# Patient Record
Sex: FEMALE | Race: BLACK OR AFRICAN AMERICAN | NOT HISPANIC OR LATINO | Employment: FULL TIME | URBAN - METROPOLITAN AREA
[De-identification: names, ages, dates, MRNs, and addresses within clinical notes are randomized per-mention and may not be internally consistent; named-entity substitution may affect disease eponyms.]

---

## 2023-08-02 ENCOUNTER — OFFICE VISIT (OUTPATIENT)
Dept: PRIMARY CARE | Facility: CLINIC | Age: 28
End: 2023-08-02
Payer: MEDICAID

## 2023-08-02 ENCOUNTER — LAB (OUTPATIENT)
Dept: LAB | Facility: LAB | Age: 28
End: 2023-08-02
Payer: MEDICAID

## 2023-08-02 VITALS
TEMPERATURE: 97.8 F | HEART RATE: 121 BPM | SYSTOLIC BLOOD PRESSURE: 113 MMHG | HEIGHT: 65 IN | DIASTOLIC BLOOD PRESSURE: 83 MMHG | WEIGHT: 232.1 LBS | OXYGEN SATURATION: 100 % | BODY MASS INDEX: 38.67 KG/M2

## 2023-08-02 DIAGNOSIS — E03.9 ACQUIRED HYPOTHYROIDISM: ICD-10-CM

## 2023-08-02 DIAGNOSIS — D50.9 IRON DEFICIENCY ANEMIA, UNSPECIFIED IRON DEFICIENCY ANEMIA TYPE: Primary | ICD-10-CM

## 2023-08-02 LAB
ERYTHROCYTE DISTRIBUTION WIDTH (RATIO) BY AUTOMATED COUNT: 16.8 % (ref 11.5–14.5)
ERYTHROCYTE MEAN CORPUSCULAR HEMOGLOBIN CONCENTRATION (G/DL) BY AUTOMATED: 30.8 G/DL (ref 32–36)
ERYTHROCYTE MEAN CORPUSCULAR VOLUME (FL) BY AUTOMATED COUNT: 95 FL (ref 80–100)
ERYTHROCYTES (10*6/UL) IN BLOOD BY AUTOMATED COUNT: 3.9 X10E12/L (ref 4–5.2)
HEMATOCRIT (%) IN BLOOD BY AUTOMATED COUNT: 37 % (ref 36–46)
HEMOGLOBIN (G/DL) IN BLOOD: 11.4 G/DL (ref 12–16)
LEUKOCYTES (10*3/UL) IN BLOOD BY AUTOMATED COUNT: 8.7 X10E9/L (ref 4.4–11.3)
NRBC (PER 100 WBCS) BY AUTOMATED COUNT: 0 /100 WBC (ref 0–0)
PLATELETS (10*3/UL) IN BLOOD AUTOMATED COUNT: 260 X10E9/L (ref 150–450)

## 2023-08-02 PROCEDURE — 3008F BODY MASS INDEX DOCD: CPT | Performed by: STUDENT IN AN ORGANIZED HEALTH CARE EDUCATION/TRAINING PROGRAM

## 2023-08-02 PROCEDURE — 85027 COMPLETE CBC AUTOMATED: CPT

## 2023-08-02 PROCEDURE — 36415 COLL VENOUS BLD VENIPUNCTURE: CPT

## 2023-08-02 PROCEDURE — 1036F TOBACCO NON-USER: CPT | Performed by: STUDENT IN AN ORGANIZED HEALTH CARE EDUCATION/TRAINING PROGRAM

## 2023-08-02 PROCEDURE — 99204 OFFICE O/P NEW MOD 45 MIN: CPT | Performed by: STUDENT IN AN ORGANIZED HEALTH CARE EDUCATION/TRAINING PROGRAM

## 2023-08-02 RX ORDER — ONDANSETRON 4 MG/1
TABLET, FILM COATED ORAL
COMMUNITY
Start: 2023-06-09

## 2023-08-02 RX ORDER — LEVOTHYROXINE SODIUM 125 UG/1
1 TABLET ORAL
COMMUNITY
Start: 2016-11-09 | End: 2023-08-02 | Stop reason: SDUPTHER

## 2023-08-02 RX ORDER — LEVOTHYROXINE SODIUM 125 UG/1
125 TABLET ORAL
Qty: 90 TABLET | Refills: 1 | Status: SHIPPED | OUTPATIENT
Start: 2023-08-02 | End: 2023-10-13 | Stop reason: ALTCHOICE

## 2023-08-02 RX ORDER — SUCRALFATE 1 G/1
TABLET ORAL
COMMUNITY
Start: 2023-07-10 | End: 2023-10-13 | Stop reason: ALTCHOICE

## 2023-08-02 RX ORDER — DESONIDE 0.5 MG/G
OINTMENT TOPICAL
COMMUNITY
Start: 2023-07-28

## 2023-08-02 RX ORDER — OMEPRAZOLE 40 MG/1
CAPSULE, DELAYED RELEASE ORAL
COMMUNITY
Start: 2023-07-10 | End: 2023-10-13 | Stop reason: ALTCHOICE

## 2023-08-02 RX ORDER — FERROUS SULFATE 325(65) MG
325 TABLET ORAL 2 TIMES DAILY
COMMUNITY
Start: 2017-05-24 | End: 2023-10-13 | Stop reason: ALTCHOICE

## 2023-08-02 RX ORDER — OMEGA-3S/DHA/EPA/FISH OIL/D3 300MG-1000
50 CAPSULE ORAL DAILY
COMMUNITY
End: 2023-10-13 | Stop reason: ALTCHOICE

## 2023-08-02 RX ORDER — MULTIVITAMIN
TABLET ORAL
COMMUNITY

## 2023-08-02 RX ORDER — LANOLIN ALCOHOL/MO/W.PET/CERES
100 CREAM (GRAM) TOPICAL 3 TIMES DAILY
COMMUNITY
Start: 2023-05-18 | End: 2023-10-13 | Stop reason: ALTCHOICE

## 2023-08-02 RX ORDER — CLOBETASOL PROPIONATE 0.5 MG/G
OINTMENT TOPICAL
COMMUNITY
Start: 2023-07-28

## 2023-08-02 ASSESSMENT — PAIN SCALES - GENERAL: PAINLEVEL: 0-NO PAIN

## 2023-08-02 NOTE — PROGRESS NOTES
27 y/o year old female here to establish care with a new PCP. Patient doing well with no acute complaints at this time. Currently on liquid diet. Tolerating it well. Will see general surgery next month. Needs medication refil.     Previous PCP: None      Hospital course:  Janneth García is a 27 year old Female with a history of hypothyroidism and obesity s/p laparoscopic RNYGB in 12/2022 at [facility] who was admitted from 7/12/23 to 7/17/23 for hematemesis and melena.   Patient was recently admitted to [facility] from 7/6/23 to 7/9/23 for hematemesis which involved a MICU admission and intubation for hypovolemic shock. An EGD with GI revealed ulceration at the GJ anastomosis and hemostasis was achieved with epi, clips, and hemostatic spray. Patient returned to the hospital for an episode of hematemesis and melena. On initial assessment in the ED, vitals include  and /96 with labs showing WBC 14.1, Hgb 10.1, and Plt 380. Later that evening, patient's blood pressure dropped to 88/56 so she received LR bolus with pressures normalizing after as well as a unit of pRBCs for drop of hemoglobin to 8.   Kim Tubbs was called on 7/12 for multiple bouts of hematemesis totaling over >100cc which consisted of initially large clots with blood, and progressing to bright red blood, hypotension, and tachycardia. After receiving a fluid bolus she was transferred to the ICU and had one unit of pRBCs transfused. She was urgently taken to the OR for a laparoscopic GJ revision with Dr. Haim House. Post-operatively the patient recovered well. Esophagram completed on POD 3 negative for leak and diet was advanced to full liquids. Upon discharge, VSS, HGB stable at 9.1, pain was well controlled, tolerating full liquids, ambulating, and voiding with BM. LIV drain was removed prior to discharge. She was discharged with outpatient surgical follow up with Dr. House and bariatric dietician.       OBSTETRIC HISTORY:  G/P: G1 , son 6 year    Abortions: None  Vaginal Delivery: Yes      GYNECOLOGICAL HISTORY:  LMP:  June 2023  Intermenstrual bleeding: None  Last PAP: Jan 2023  History of Abnormal PAP: None  Birth Control: IUD  No concern STI; does have Herpes     PAST MEDICAL HISTORY:  Hypothyroidism - chronic well controlled, no symptoms currently; present since high school     PAST SURGICAL HISTORY:  Gastric bypass 12/2022  Cholecystectomy  5/3023    FAMILY HISTORY:  Father- passed away 2022,  62 yo MI  Mother- HTN     SOCIAL HISTORY:  Tobacco: None   ETOH: None  Drug: None   Occupation: Works  at [employer].   Exercise: Walking 2x per week- 60 minutes   Family: presents with boyfriend today; has 6 year old son.     ALLERGIES:  None    PAST PREVENTATIVE CARE:  [facility]- Papsmear -negative Jan 2022      REVIEW OF SYSTEMS:   No weight changes, fatigue, fevers, chills, night sweats   No itching  No headaches,  dizziness, vision changes, hearing changes, bleeding gums  No palpitations, chest pain, leg swelling  No cough, SOB, wheezing  No abdominal pain, nausea, vomiting, appetite changes  No urinary urgency, dysuria, urinary frequency  No melena, hematuria  No bleeding, bruising             PHYSICAL EXAM:  Gen: NAD, A&O x 3, Well developed  HEENT: Normal size, shape; no masses noted in neck, no lymphadenopathy, non-palpable thyroid  Chest: chest symmetry with respirations, no wheezes, crackles  CVS: RRR, no rubs  Abd: soft, NT/ND, +BS  Ext: no edema, nontender  Skin: Dry, warm, good condition  Neuro: grossly normal  Psy: Mood and affect appropriate        Diagnoses and all orders for this visit:    27 y/o F with PMHx of hypothyroidism, obesity, iron deficient anemia, s/p laparoscopic RNYGB in 12/2022 at [facility] who presents to [facility] care and hospital follow up.       Iron deficiency anemia, unspecified iron deficiency anemia type  -     CBC- today for hospital follow up (she had hematemesis and melena at admission)    Acquired hypothyroidism,  chronic   -     TSH; future - to get in 4-6 weeks   -     levothyroxine (Synthroid, Levoxyl) 125 mcg tablet; Take 1 tablet (125 mcg) by mouth once daily.  Other orders  -     Follow Up In Primary Care - Established; Future 3 months or sooner pending CBC results or if anything arises     Preventative Care:  -Up to date on Pap smear      Patient seen and d/w attending , Dr. Hill.     Tianna Coronado MD   Family Medicine, PGY1  [facility]

## 2023-08-04 NOTE — PROGRESS NOTES
I saw and evaluated the patient. I personally obtained the key and critical portions of the history and physical exam or was physically present for key and critical portions performed by the resident/fellow. I reviewed the resident/fellow's documentation and discussed the patient with the resident/fellow. I agree with the resident/fellow's medical decision making as documented in the note.    Vish Hill MD

## 2023-08-31 PROBLEM — G89.18 ACUTE POSTOPERATIVE PAIN: Status: ACTIVE | Noted: 2023-08-31

## 2023-08-31 PROBLEM — K28.9 MARGINAL ULCER: Status: ACTIVE | Noted: 2023-08-31

## 2023-08-31 PROBLEM — Z90.49 ACQUIRED ABSENCE OF OTHER SPECIFIED PARTS OF DIGESTIVE TRACT: Status: ACTIVE | Noted: 2023-07-17

## 2023-08-31 PROBLEM — Z72.53 HIGH RISK BISEXUAL BEHAVIOR: Status: ACTIVE | Noted: 2023-08-31

## 2023-08-31 PROBLEM — Z86.39 PERSONAL HISTORY OF OTHER ENDOCRINE, NUTRITIONAL AND METABOLIC DISEASE: Status: ACTIVE | Noted: 2023-02-22

## 2023-08-31 PROBLEM — E83.42 HYPOMAGNESEMIA: Status: ACTIVE | Noted: 2023-07-17

## 2023-08-31 PROBLEM — I10 ESSENTIAL (PRIMARY) HYPERTENSION: Status: ACTIVE | Noted: 2023-07-10

## 2023-08-31 PROBLEM — E83.51 HYPOCALCEMIA: Status: ACTIVE | Noted: 2023-07-17

## 2023-08-31 PROBLEM — R91.1 SOLITARY PULMONARY NODULE: Status: ACTIVE | Noted: 2023-07-10

## 2023-08-31 PROBLEM — A59.01 TRICHOMONAL VULVOVAGINITIS: Status: ACTIVE | Noted: 2023-08-31

## 2023-08-31 PROBLEM — F10.10 ALCOHOL ABUSE, UNCOMPLICATED: Status: ACTIVE | Noted: 2022-03-14

## 2023-08-31 PROBLEM — Z98.84 BARIATRIC SURGERY STATUS: Status: ACTIVE | Noted: 2023-08-31

## 2023-08-31 PROBLEM — I95.9 HYPOTENSION: Status: ACTIVE | Noted: 2023-08-31

## 2023-08-31 PROBLEM — K56.600: Status: ACTIVE | Noted: 2023-07-10

## 2023-08-31 PROBLEM — R10.9 ABDOMINAL PAIN: Status: ACTIVE | Noted: 2023-02-22

## 2023-08-31 PROBLEM — R30.0 DYSURIA: Status: ACTIVE | Noted: 2023-02-22

## 2023-08-31 PROBLEM — Z71.89 OTHER SPECIFIED COUNSELING: Status: ACTIVE | Noted: 2023-08-31

## 2023-08-31 PROBLEM — B00.9 HERPESVIRAL INFECTION: Status: ACTIVE | Noted: 2023-08-31

## 2023-08-31 PROBLEM — Z79.899 OTHER LONG TERM (CURRENT) DRUG THERAPY: Status: ACTIVE | Noted: 2023-07-10

## 2023-08-31 PROBLEM — Z72.51 HIGH RISK HETEROSEXUAL BEHAVIOR: Status: ACTIVE | Noted: 2023-08-31

## 2023-08-31 PROBLEM — K92.2 GASTROINTESTINAL HEMORRHAGE: Status: ACTIVE | Noted: 2023-07-06

## 2023-08-31 PROBLEM — N94.89 OTHER SPECIFIED CONDITIONS ASSOCIATED WITH FEMALE GENITAL ORGANS AND MENSTRUAL CYCLE: Status: ACTIVE | Noted: 2023-08-31

## 2023-08-31 PROBLEM — K28.9 ANASTOMOTIC ULCER: Status: ACTIVE | Noted: 2023-08-31

## 2023-08-31 PROBLEM — K28.4: Status: ACTIVE | Noted: 2023-07-17

## 2023-08-31 PROBLEM — E87.6 HYPOKALEMIA: Status: ACTIVE | Noted: 2023-07-17

## 2023-08-31 PROBLEM — Z98.890 OTHER SPECIFIED POSTPROCEDURAL STATES: Status: ACTIVE | Noted: 2023-08-31

## 2023-08-31 PROBLEM — R87.611 ATYPICAL SQUAMOUS CELLS CANNOT EXCLUDE HIGH GRADE SQUAMOUS INTRAEPITHELIAL LESION ON CYTOLOGIC SMEAR OF CERVIX (ASC-H): Status: ACTIVE | Noted: 2022-12-16

## 2023-08-31 PROBLEM — Z97.5 PRESENCE OF (INTRAUTERINE) CONTRACEPTIVE DEVICE: Status: ACTIVE | Noted: 2023-07-10

## 2023-08-31 PROBLEM — Z91.199 POOR COMPLIANCE: Status: ACTIVE | Noted: 2017-07-12

## 2023-08-31 PROBLEM — O09.90 SUPERVISION OF HIGH-RISK PREGNANCY (HHS-HCC): Status: ACTIVE | Noted: 2017-02-06

## 2023-08-31 PROBLEM — N92.6 IRREGULAR MENSTRUATION, UNSPECIFIED: Status: ACTIVE | Noted: 2023-08-31

## 2023-08-31 PROBLEM — N87.0 MILD CERVICAL DYSPLASIA: Status: ACTIVE | Noted: 2023-08-31

## 2023-08-31 PROBLEM — R21 RASH AND OTHER NONSPECIFIC SKIN ERUPTION: Status: ACTIVE | Noted: 2023-07-20

## 2023-08-31 PROBLEM — D72.829 ELEVATED WHITE BLOOD CELL COUNT, UNSPECIFIED: Status: ACTIVE | Noted: 2023-07-17

## 2023-08-31 PROBLEM — D64.9 ANEMIA: Status: ACTIVE | Noted: 2023-08-31

## 2023-08-31 PROBLEM — R03.0 ELEVATED BP WITHOUT DIAGNOSIS OF HYPERTENSION: Status: ACTIVE | Noted: 2017-07-20

## 2023-08-31 PROBLEM — E03.9 HYPOTHYROIDISM, UNSPECIFIED: Status: ACTIVE | Noted: 2023-07-17

## 2023-08-31 PROBLEM — K80.50 BILIARY COLIC: Status: ACTIVE | Noted: 2023-05-18

## 2023-08-31 PROBLEM — E89.0 POSTABLATIVE HYPOTHYROIDISM: Status: ACTIVE | Noted: 2023-08-31

## 2023-08-31 PROBLEM — G93.9 DISORDER OF BRAIN: Status: ACTIVE | Noted: 2023-08-31

## 2023-08-31 PROBLEM — D68.9 COAGULATION DEFECT, UNSPECIFIED (MULTI): Status: ACTIVE | Noted: 2023-07-17

## 2023-08-31 PROBLEM — K91.89 OTHER POSTPROCEDURAL COMPLICATIONS AND DISORDERS OF DIGESTIVE SYSTEM: Status: ACTIVE | Noted: 2023-07-06

## 2023-08-31 PROBLEM — R57.9 SHOCK (MULTI): Status: ACTIVE | Noted: 2023-07-17

## 2023-08-31 PROBLEM — D62 ACUTE POSTHEMORRHAGIC ANEMIA: Status: ACTIVE | Noted: 2023-07-17

## 2023-08-31 PROBLEM — B49 INFECTION DUE TO FUNGUS: Status: ACTIVE | Noted: 2023-07-20

## 2023-08-31 PROBLEM — G93.49 OTHER ENCEPHALOPATHY: Status: ACTIVE | Noted: 2023-07-10

## 2023-08-31 PROBLEM — E55.9 VITAMIN D DEFICIENCY: Status: ACTIVE | Noted: 2023-08-31

## 2023-08-31 PROBLEM — Z79.890 HORMONE REPLACEMENT THERAPY: Status: ACTIVE | Noted: 2023-07-10

## 2023-08-31 PROBLEM — K92.0 VOMITING BLOOD: Status: ACTIVE | Noted: 2023-08-31

## 2023-08-31 RX ORDER — HYOSCYAMINE SULFATE 0.125 MG
0.12 TABLET ORAL 4 TIMES DAILY PRN
COMMUNITY
End: 2023-10-13 | Stop reason: ALTCHOICE

## 2023-08-31 RX ORDER — FLUCONAZOLE 150 MG/1
TABLET ORAL
COMMUNITY
Start: 2023-07-20 | End: 2023-10-13 | Stop reason: ALTCHOICE

## 2023-08-31 RX ORDER — IBUPROFEN 600 MG/1
TABLET ORAL EVERY 6 HOURS PRN
COMMUNITY
Start: 2018-06-25 | End: 2023-10-13 | Stop reason: ALTCHOICE

## 2023-08-31 RX ORDER — ONDANSETRON 4 MG/1
TABLET, ORALLY DISINTEGRATING ORAL
COMMUNITY
Start: 2023-07-17 | End: 2023-10-13 | Stop reason: ALTCHOICE

## 2023-08-31 RX ORDER — NITROFURANTOIN 25; 75 MG/1; MG/1
CAPSULE ORAL EVERY 12 HOURS
COMMUNITY
Start: 2017-05-07 | End: 2023-10-13 | Stop reason: ALTCHOICE

## 2023-10-02 NOTE — PROGRESS NOTES
BARIATRIC SURGERY CLINIC  FOLLOW UP NOTE      Name: Janneth García  MRN: [id number]    Virtual or Telephone Consent    {Complete for a virtual or telephone visit:08398}  {Select who provided consent:12541}    Index Surgery  Date of Surgery: s/p Revision of RYGBP due to GJ non healing ulcer   Origin surgery - 12/2022 RYGBP AT [facility],    Surgeon: Asya     Surgical Procedure: Revision of gastrojejunal anastamosis 22300    HPI:   Presenting for follow up visit.  3 mo Post Op revision.      Diet ***    Exercise ***    Concerns related to:  Nausea/Vomiting, Reflux: ***  Abdominal Pain: ***  Diarrhea/Constipation ***    DAILY SUPPLEMENTS:  Calcium: Calcium Citrate w/ vitamin D (1200 - 1500mg)  Multivitamin & Minerals: 2 per day  Vitamin B12: 500 mcg/day   Vitamin D3: 3000 units  Iron/Other: ***  PPI:***      Current Outpatient Medications   Medication Sig Dispense Refill    clobetasol (Temovate) 0.05 % ointment       desonide (DesOwen) 0.05 % ointment       ferrous sulfate 325 (65 Fe) MG tablet Take 1 tablet (325 mg) by mouth twice a day.      fluconazole (Diflucan) 150 mg tablet Take by mouth.      hyoscyamine (Anaspaz, Levsin) 0.125 mg tablet Take 1 tablet (0.125 mg) by mouth 4 times a day as needed.      ibuprofen 600 mg tablet Take by mouth every 6 hours if needed.      levonorgestreL (LILETTA) 20.4 mcg/24 hrs (8 yrs) 52 mg intrauterine device by intrauterine route.      levothyroxine (Synthroid, Levoxyl) 125 mcg tablet Take 1 tablet (125 mcg) by mouth once daily. 90 tablet 1    levothyroxine (Synthroid, Levoxyl) 125 mcg tablet TAKE 1 TABLET BY MOUTH ONCE DAILY 30 tablet 0    multivitamin tablet take 1 tablet by oral route  every day with food      nitrofurantoin, macrocrystal-monohydrate, (Macrobid) 100 mg capsule Take by mouth every 12 hours.      omeprazole (PriLOSEC) 40 mg DR capsule       omeprazole (PriLOSEC) 40 mg DR capsule TAKE 1 CAPSULE BY MOUTH TWO TIMES A DAY 60 capsule 0    ondansetron (Zofran) 4 mg  tablet       ondansetron ODT (Zofran-ODT) 4 mg disintegrating tablet       ondansetron ODT (Zofran-ODT) 4 mg disintegrating tablet DISSOLVE 1 TABLET IN MOUTH BY MOUTH EVERY 8 HOURS AS NEEDED FOR NAUSEA 21 tablet 0    oxyCODONE (Roxicodone) 5 mg/5 mL solution TAKE 5 ML BY MOUTH EVERY 6 HOURS AS NEEDED FOR PAIN 60 mL 0    pantoprazole (ProtoNix) 40 mg EC tablet TAKE 1 TABLET BY MOUTH TWO TIMES A DAY FOR ACID SUPPRESSION 60 tablet 0    PNV no.95/ferrous fum/folic ac (PRENATAL ORAL) Take 1 tablet by mouth once daily.      polyethylene glycol (Glycolax, Miralax) 17 gram/dose powder MIX ONE CAPFUL (17 GRAMS) IN 8 OUNCES OF LIQUID AND DRINK BY MOUTH ONCE DAILY AS NEEDED FOR CONSTIPATION 238 g 0    RANITIDINE HCL ORAL Take 150 mg by mouth twice a day.      sucralfate (Carafate) 1 gram tablet       sucralfate (Carafate) 1 gram tablet DISSOLVE 1 TABLET IN WATER AND TAKE BY MOUTH AS A SLURRY FOUR TIMES A DAY (BEFORE MEALS AND AT BEDTIME) 120 tablet 0    thiamine 100 mg tablet Take 1 tablet (100 mg) by mouth 3 times a day.      Vitamin D3 50 mcg (2,000 unit) tablet Take 1 tablet (50 mcg) by mouth once daily.       No current facility-administered medications for this visit.       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    Coagulation defect, unspecified (CMS/HCC)    Vitamin D deficiency    Trichomonal vulvovaginitis    Abdominal pain    Solitary pulmonary nodule    Presence of (intrauterine) contraceptive device    Personal history of other endocrine, nutritional and metabolic disease    Other specified postprocedural states    Infection due to fungus    Postablative hypothyroidism    Hypotension    Gastrointestinal  hemorrhage    Essential (primary) hypertension    Disorder of brain    Chlamydia infection affecting pregnancy    Biliary colic    Atypical squamous cells cannot exclude high grade squamous intraepithelial lesion on cytologic smear of cervix (ASC-H)    Acute postoperative pain    Acute posthemorrhagic anemia    Other specified counseling    Other specified conditions associated with female genital organs and menstrual cycle    Elevated BP without diagnosis of hypertension    Shock (CMS/HCC)    Anemia    Elevated white blood cell count, unspecified    Bariatric surgery status    Other postprocedural complications and disorders of digestive system    Herpesviral infection    Other long term (current) drug therapy    Other encephalopathy    Mild cervical dysplasia    Marginal ulcer    Irregular menstruation, unspecified    Hypocalcemia    High risk bisexual behavior    High risk heterosexual behavior    Hormone replacement therapy    Hypokalemia    Hypomagnesemia    Vomiting blood    Poor compliance    Rash and other nonspecific skin eruption    Supervision of high-risk pregnancy    Hypothyroidism, unspecified    Partial intestinal obstruction, unspecified as to cause (CMS/HCC)         REVIEW OF SYSTEMS:  CONSTITUTIONAL: Patient denies fevers, chills, sweats and weight changes.  CARDIOVASCULAR: Patient denies chest pains, palpitations, orthopnea and paroxysmal nocturnal dyspnea.  RESPIRATORY: No dyspnea on exertion, no wheezing or cough.  GI: No nausea, vomiting, diarrhea, constipation, abdominal pain, hematochezia or melena.  MUSCULOSKELETAL: No myalgias or arthralgias.  NEUROLOGIC: No chronic headaches, no seizures. Patient denies numbness, tingling or weakness.  PSYCHIATRIC: Patient denies problems with mood disturbance. No problems with anxiety.  ENDOCRINE: No excessive urination or excessive thirst.  DERMATOLOGIC: Patient denies any rashes or skin changes.    PHYSICAL EXAM:  There were no vitals taken for this  visit.  Alert, well appearing, no acute distress, nourished, hydrated.  Anicteric sclera, no ptosis  Facial symmetry  Neck supple  Unlabored respirations.  Easily conversant without increased respiratory effort  Oriented to person, place, time.  Judgement intact.  Appropriate mood, affect.       ASSESSMENT & PLAN:  28 y.o. female presenting for follow up visit s/p revision of GJ anastamosis for nonhealing bleeding erosive ulcer 7/20/2023  Origin surgery 12/2022 RYGBP at [facility]    No acute post bariatric surgery complications  No acute issues or concerns presented today.  Tolerating diet with appropriate protein and fluid intake  Taking appropriate supplements  Bowel regularity ***  Exercise ***  Weight Loss: Initial  -> Current   Wt Readings from Last 1 Encounters:   08/02/23 105 kg (232 lb 1.6 oz)       Plan:  -Continue to follow protein forward, reduced calorie, whole foods based diet, follow diet direction of bariatric RD  -Continue to participate in regular exercise with goal to achieve 200-300 minutes per week of aerobic & resistance training for preservation of lean body mass.  -Continue multivitamin and supplements to support micronutrient needs  -Ongoing need for anti reflux medications discussed and continued if appropriate - see orders.  -Bowel hygiene reviewed, encouraged adequate fluids, increased dietary fiber and reviewed as needed use of over the counter treatments to promote bowel support.   -Labs - see orders      Follow up 3 mo

## 2023-10-09 ENCOUNTER — TELEMEDICINE (OUTPATIENT)
Dept: SURGERY | Facility: CLINIC | Age: 28
End: 2023-10-09
Payer: MEDICAID

## 2023-10-09 ENCOUNTER — APPOINTMENT (OUTPATIENT)
Dept: SURGERY | Facility: CLINIC | Age: 28
End: 2023-10-09
Payer: MEDICAID

## 2023-10-11 NOTE — PROGRESS NOTES
BARIATRIC SURGERY CLINIC  FOLLOW UP NOTE      Name: Janneth García  MRN: [id number]    Index Surgery  Date of Surgery: s/p Revision of RYGBP due to GJ non healing ulcer   Origin surgery - 12/2022 RYGBP AT [facility],    Surgeon: Asya     Surgical Procedure: Revision of gastrojejunal anastamosis 76061    HPI:   Presenting for follow up visit.  3 mo Post Op revision.      Diet Met with RD this AM, nearly meeting protein goals with regular diet, using occ shake    Exercise 30 minutes walking on treadmill every other day.  Light weights    Concerns related to:  Nausea/Vomiting, Reflux: Denies   Abdominal Pain: Denies  Diarrhea/Constipation Denies    DAILY SUPPLEMENTS:  Calcium: Calcium Citrate w/ vitamin D (1200 - 1500mg) - not taking due to cost  Multivitamin & Minerals: 2 per day - [employer]  Vitamin B12: not taking  Vitamin D3: 3000 units  Iron/Other: denies  PPI: 40mg      Current Outpatient Medications   Medication Sig Dispense Refill    clobetasol (Temovate) 0.05 % ointment       desonide (DesOwen) 0.05 % ointment       levonorgestreL (LILETTA) 20.4 mcg/24 hrs (8 yrs) 52 mg intrauterine device by intrauterine route.      levothyroxine (Synthroid, Levoxyl) 125 mcg tablet TAKE 1 TABLET BY MOUTH ONCE DAILY 30 tablet 0    multivitamin tablet take 1 tablet by oral route  every day with food      ondansetron (Zofran) 4 mg tablet       pantoprazole (ProtoNix) 40 mg EC tablet TAKE 1 TABLET BY MOUTH TWO TIMES A DAY FOR ACID SUPPRESSION 60 tablet 0    PNV no.95/ferrous fum/folic ac (PRENATAL ORAL) Take 1 tablet by mouth once daily.      calcium-vitamin D3-vitamin K (Citracal-D3 Soft Chew) 500 mg-1,000 unit-40 mcg tablet,chewable Chew 1 tablet 3 times a day before meals. 90 tablet 11    cholecalciferol (Vitamin D3) 50 MCG (2000 UT) tablet Take 1 tablet (50 mcg) by mouth once daily. 30 tablet 11    omeprazole (PriLOSEC) 40 mg DR capsule Take 1 capsule (40 mg) by mouth once daily in the morning. Take before meals. 90 capsule 0      No current facility-administered medications for this visit.       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    Coagulation defect, unspecified (CMS/[facility])    Vitamin D deficiency    Trichomonal vulvovaginitis    Abdominal pain    Solitary pulmonary nodule    Presence of (intrauterine) contraceptive device    Personal history of other endocrine, nutritional and metabolic disease    Other specified postprocedural states    Infection due to fungus    Postablative hypothyroidism    Hypotension    Gastrointestinal hemorrhage    Essential (primary) hypertension    Disorder of brain    Chlamydia infection affecting pregnancy    Biliary colic    Atypical squamous cells cannot exclude high grade squamous intraepithelial lesion on cytologic smear of cervix (ASC-H)    Acute postoperative pain    Acute posthemorrhagic anemia    Other specified counseling    Other specified conditions associated with female genital organs and menstrual cycle    Elevated BP without diagnosis of hypertension    Shock (CMS/[facility])    Anemia    Elevated white blood cell count, unspecified    S/P gastric bypass    Other postprocedural complications and disorders of digestive system    Herpesviral infection    Other long term (current) drug therapy    Other encephalopathy    Mild cervical dysplasia    Marginal ulcer    Irregular menstruation, unspecified    Hypocalcemia    High risk bisexual behavior    High risk heterosexual behavior    Hormone replacement therapy    Hypokalemia    Hypomagnesemia    Vomiting blood    Poor compliance    Rash and other nonspecific skin eruption    Supervision of high-risk pregnancy    Hypothyroidism, unspecified     Partial intestinal obstruction, unspecified as to cause (CMS/[facility])         REVIEW OF SYSTEMS:  CONSTITUTIONAL: Patient denies fevers, chills, sweats and weight changes.  CARDIOVASCULAR: Patient denies chest pains, palpitations, orthopnea and paroxysmal nocturnal dyspnea.  RESPIRATORY: No dyspnea on exertion, no wheezing or cough.  GI: No nausea, vomiting, diarrhea, constipation, abdominal pain, hematochezia or melena.  MUSCULOSKELETAL: No myalgias or arthralgias.  NEUROLOGIC: No chronic headaches, no seizures. Patient denies numbness, tingling or weakness.  PSYCHIATRIC: Patient denies problems with mood disturbance. No problems with anxiety.  ENDOCRINE: No excessive urination or excessive thirst.  DERMATOLOGIC: Patient denies any rashes or skin changes.    PHYSICAL EXAM:  Wt 96.2 kg (212 lb)   BMI 36.39 kg/m²   Alert, well appearing, no acute distress, nourished, hydrated.  Anicteric sclera, no ptosis  Facial symmetry  Neck supple  Unlabored respirations.  Easily conversant without increased respiratory effort  Oriented to person, place, time.  Judgement intact.  Appropriate mood, affect.       ASSESSMENT & PLAN:  28 y.o. female presenting for follow up visit s/p revision of GJ anastamosis for nonhealing bleeding erosive ulcer 7/20/2023  Origin surgery 12/2022 RYGBP at [facility]    No acute post bariatric surgery complications  No acute issues or concerns presented today.  Tolerating diet with appropriate protein and fluid intake  Taking appropriate supplements  Bowel regularity WNL  Exercise 30-45 min of walking 4-5x/week    Weight Loss: Initial  368  -> Current   Wt Readings from Last 1 Encounters:   10/13/23 96.2 kg (212 lb)       Plan:  -Continue to follow protein forward, reduced calorie, whole foods based diet, follow diet direction of bariatric RD  -Continue to participate in regular exercise with goal to achieve 200-300 minutes per week of aerobic & resistance training for preservation of lean body  mass.  -Continue multivitamin and supplements to support micronutrient needs  -Ongoing need for anti reflux medications discussed and continued if appropriate - see orders.  -Bowel hygiene reviewed, encouraged adequate fluids, increased dietary fiber and reviewed as needed use of over the counter treatments to promote bowel support.   -Labs - see orders      Follow up 3 mo

## 2023-10-13 ENCOUNTER — TELEMEDICINE (OUTPATIENT)
Dept: SURGERY | Facility: CLINIC | Age: 28
End: 2023-10-13
Payer: MEDICAID

## 2023-10-13 ENCOUNTER — TELEMEDICINE CLINICAL SUPPORT (OUTPATIENT)
Dept: SURGERY | Facility: CLINIC | Age: 28
End: 2023-10-13
Payer: MEDICAID

## 2023-10-13 VITALS — HEIGHT: 64 IN | WEIGHT: 212 LBS | BODY MASS INDEX: 36.19 KG/M2

## 2023-10-13 VITALS — WEIGHT: 212 LBS | BODY MASS INDEX: 36.39 KG/M2

## 2023-10-13 DIAGNOSIS — Z98.84 S/P GASTRIC BYPASS: Primary | ICD-10-CM

## 2023-10-13 DIAGNOSIS — K91.2 POSTOPERATIVE MALABSORPTION (HHS-HCC): ICD-10-CM

## 2023-10-13 PROCEDURE — 99024 POSTOP FOLLOW-UP VISIT: CPT | Performed by: NURSE PRACTITIONER

## 2023-10-13 RX ORDER — DEXTROMETHORPHAN POLISTIREX 30 MG/5 ML
1 SUSPENSION, EXTENDED RELEASE 12 HR ORAL
Qty: 90 TABLET | Refills: 11 | Status: SHIPPED | OUTPATIENT
Start: 2023-10-13

## 2023-10-13 RX ORDER — OMEPRAZOLE 40 MG/1
40 CAPSULE, DELAYED RELEASE ORAL
Qty: 90 CAPSULE | Refills: 0 | Status: SHIPPED | OUTPATIENT
Start: 2023-10-13 | End: 2024-01-11

## 2023-10-13 RX ORDER — CHOLECALCIFEROL (VITAMIN D3) 50 MCG
50 TABLET ORAL DAILY
Qty: 30 TABLET | Refills: 11 | Status: SHIPPED | OUTPATIENT
Start: 2023-10-13 | End: 2024-10-12

## 2023-10-13 NOTE — PATIENT INSTRUCTIONS
"The following are the recommendations we discussed at your appointment today:  1. Nutrition  - Please make sure you are seeing the bariatric dietitian regularly.    Dietitian Information:   Danika Marcos: [phone]  [facility] [place] [phone]    Your Protein Goals will gradually increase as you get further out from surgery:    3-6 months - 60-75 GRAMS PER DAY      - Follow Pouch Rules;.   Stop drinking 30 minutes before your meals, Take 30 minutes to eat your meal   - NO DRINKING DURING MEALS, Wait for 30 minutes after your meal to drink  - Eat 5 servings of fruits and veggies daily.  A serving is 1 small (tennis ball size) piece of whole fruit, 1/2 cup fresh fruit, 1 cup non starchy vegetables  - Eat 3 small meals and 1-2 healthy, protein rich, snacks per day.    EAT PROTEIN FIRST AT MEALS, 2nd non starchy vegetable or fruit serving, consume starches last and aim for whole grains of small portion.  This pattern of eating ensures you are getting full on high quality protein and higher fiber foods with many vitamins and minerals to support adequate nutrition first.      2. Exercise Recommendations:    Regular physical activity is CRITICAL for long term successful weight management.    Strive for a minimum weekly exercise goal of at least 200 minutes per week of aerobic exercise (45 minutes at least 5 days per week or 30 minutes daily)    Strength based resistance training is critical for helping to maintain and build lean body mass/muscle mass which is very important for long term health.  Having higher muscle mass is associated with better health outcomes and can help to maintain higher calorie expenditure.      Designing a Strength Program:  Select 3-4 exercises that target different major muscle groups.  - Emphasize the following movements \"pull, push, squat, hip hinge\"  \"Pull\" examples - pull up, bent over row or dumbbell row, pull down with weight bar or resistance band  \"Push\" examples - " "push up, bench press, chest flys, dips, overhead press, dumbbell bench press  \"Squat\" examples - air squat, chair squat, lunge steps, goblet squat, front squat, etc  \"Hip hinge\" examples - kettle bell swing, good morning, glute bridge, deadlift, suitcase pick ups, hip thrust    Perform two to three sets of eight to 15 repetitions of each exercise.  Try to use a resistance that feels like an \"8 out of 10\" effort, with 10 being the highest effort you can give.    To get stronger, try increasing either the weight, number of repetitions, number of sets or number of exercises every 4-8 weeks as you feel more comfortable with your current program.      3. Fluids  - Drink at least 60 oz of water every day.   - Wait 30 minutes before or after meals to drink fluids.  - Avoid carbonated beverages.    4. Vitamins  - Remember to take your multivitamins 2 times daily, once in the morning and once in the evening  - Take your calcium 2-3 times daily, at least 2 hours apart from the multivitamin    5. Labs  - We will check labs today and results will be communicated via [facility] My Chart  - A copy of the results can be viewed on  My Chart.      Follow-up with Dietitian for bariatric diet optimization  Follow-up with PCP for general health questions and ongoing management of routine health concerns    Follow up 3 mo    "

## 2023-10-13 NOTE — PROGRESS NOTES
Surgery Date:  12/21/22, 7/11/23 GJ revision for ulcer  Surgeon[facility]   [facility]   Procedure:  gastric bypass     ASSESSMENT:  Current weight pounds:     212.0             Ht:    64.0 in.   BMI:  Body mass index is 36.39 kg/m².  Previous weight pounds:   221.0   8/24/23  Initial start weight pounds:   368.0  EBW pounds:  218.0  Total weight change pounds :  156.0  %EBW Lost: 71.5%    PROGRESS:  Nutrition Interventions for last encounter (date):   1. Continue to eat 60-70 g of protein per day.. Aim for 2 oz or 14 g protein per meal and have 2 high protein snacks that are 10-20 g protein each. You can try a tuna or chicken packet, Greek yogurt, 2 string cheeses, Protein bars like Quest, Pure Protein, Premier, or Built Bars. you can also try protein chips form Quest or Atkins.   2. Continue to drink 64 oz. of zero calorie beverages per day  3. Continue no drinking 30 min before, during the meal and for 30 minutes after the meal  4. Increase intensity and duration of exercise. Gradually add strength and toning exercises 30 min 2x/week   5. Continue current vit/min regimen. Start calcium when you get it.   6. Attend monthly support groups     CHANGES IN TREATMENT:   Patient met goals:    Partially   Actions to meet previous goals:   24 hour food recall:   Breakfast:   turkey sausage/egg/cheese on 1/2 croissant from [employer]  Snack: cantaloupe  Lunch:     1.5 baked chicken wings, broccoli  Snack:     protein shake 30 g protein   Dinner:  chicken noodle soup  Snack:   none  Beverages:   6-7 bottles, protein shake  Alcohol:   none      Vitamins:    Vit D, B1, B12, 2 Flintstones, no calcium   Medications: see list  Physical Activity:  walking for 30 min 3-4x/week and some squats, jump rope,     READINESS TO LEARN:  Motivation to learn:           Interested      Understanding of instruction: Good       Anticipated Compliance: Good       Family Support: Unable to assess-family not present     Patient presents  with post-op weight loss surgery gastric bypass.  Pt had revision of GJ anastomosis.    Patient has lost 156.0  pounds since initial assessment accounting for 71.5% loss excess body weight.  Tolerating a regular diet without difficulty.  Protein intake is not adequate for post-op individual. Fluid consumption is  adequate. Patient is not supplementing recommended vitamin/minerals. She can't afford to buy calcium supplements at this time.  Discussed cheaper options for her try  if she can.; Generic Citrical/calcium citrate Advised to work on increasing protein intake.  Discussed ways to do that.  Pt states no concerns/difficulties at this time.     Malnutrition Screening  Significant unintentional weight loss? n/a  Eating less than 75% of usual intake for more than 2 weeks? n/a     Nutrition Diagnosis:   1. Increased protein and nutrition needs related to altered GI function as evidenced by pt. s/p gastric bypass.    Nutrition Interventions:   1. Modify type and amount of food and nutrients within meals and snacks.  2. Comprehensive Nutrition Education  -Nutrition education materials: SG schedule        Recommendations:    1. Aim for 70-80 g protein per day.  Work on increasing protein intake by eating 2 oz protein  (14 grams) at every meal.  Choose foods high in protein at meal and snack times; eggs/hard boiled eggs/cheese/Greek yogurt/cottage cheese/tuna or chicken packets/deli meat roll up/protein bars/protein chips.  Remember to eat protein first at meal and snack times.   2. Continue to drink 64 oz. of zero calorie beverages per day  3. Continue no drinking 30 min before, during the meal and for 30 minutes after the meal  4. Continue to exercise  5. Continue current vit/min regimen.  Buy calcium when you can.  You try the generic version of Citrical (calcium citrate), these are pills you swallow.    6.             Attend monthly support groups    Nutrition Monitoring and Evaluation:   1-2 pounds weight loss per  week  Criteria: weight check, food recall  Need for Follow-up:  6 months postop      Priti GENTILE, CSOWM  Bariatric Surgery Dietitian  Phone: [phone]  Fax: [phone]

## 2023-12-06 ENCOUNTER — LAB (OUTPATIENT)
Dept: LAB | Facility: LAB | Age: 28
End: 2023-12-06
Payer: MEDICAID

## 2023-12-06 DIAGNOSIS — Z98.84 S/P GASTRIC BYPASS: ICD-10-CM

## 2023-12-06 DIAGNOSIS — D50.9 IRON DEFICIENCY ANEMIA, UNSPECIFIED IRON DEFICIENCY ANEMIA TYPE: ICD-10-CM

## 2023-12-06 DIAGNOSIS — K91.2 POSTOPERATIVE MALABSORPTION (HHS-HCC): ICD-10-CM

## 2023-12-06 LAB
25(OH)D3 SERPL-MCNC: 30 NG/ML (ref 30–100)
ERYTHROCYTE [DISTWIDTH] IN BLOOD BY AUTOMATED COUNT: 17.7 % (ref 11.5–14.5)
FOLATE SERPL-MCNC: 10 NG/ML
HCT VFR BLD AUTO: 39.4 % (ref 36–46)
HGB BLD-MCNC: 12.9 G/DL (ref 12–16)
MCH RBC QN AUTO: 27.9 PG (ref 26–34)
MCHC RBC AUTO-ENTMCNC: 32.7 G/DL (ref 32–36)
MCV RBC AUTO: 85 FL (ref 80–100)
NRBC BLD-RTO: 0 /100 WBCS (ref 0–0)
PLATELET # BLD AUTO: 228 X10*3/UL (ref 150–450)
RBC # BLD AUTO: 4.62 X10*6/UL (ref 4–5.2)
TSH SERPL-ACNC: 12.01 MIU/L (ref 0.44–3.98)
VIT B12 SERPL-MCNC: 304 PG/ML (ref 211–911)
WBC # BLD AUTO: 7.8 X10*3/UL (ref 4.4–11.3)

## 2023-12-06 PROCEDURE — 85027 COMPLETE CBC AUTOMATED: CPT

## 2023-12-06 PROCEDURE — 80053 COMPREHEN METABOLIC PANEL: CPT

## 2023-12-06 PROCEDURE — 84425 ASSAY OF VITAMIN B-1: CPT

## 2023-12-06 PROCEDURE — 83970 ASSAY OF PARATHORMONE: CPT

## 2023-12-06 PROCEDURE — 84443 ASSAY THYROID STIM HORMONE: CPT

## 2023-12-06 PROCEDURE — 82728 ASSAY OF FERRITIN: CPT

## 2023-12-06 PROCEDURE — 82746 ASSAY OF FOLIC ACID SERUM: CPT

## 2023-12-06 PROCEDURE — 83540 ASSAY OF IRON: CPT

## 2023-12-06 PROCEDURE — 82306 VITAMIN D 25 HYDROXY: CPT

## 2023-12-06 PROCEDURE — 36415 COLL VENOUS BLD VENIPUNCTURE: CPT

## 2023-12-06 PROCEDURE — 83550 IRON BINDING TEST: CPT

## 2023-12-06 PROCEDURE — 82607 VITAMIN B-12: CPT

## 2023-12-07 LAB
ALBUMIN SERPL BCP-MCNC: 4.1 G/DL (ref 3.4–5)
ALP SERPL-CCNC: 72 U/L (ref 33–110)
ALT SERPL W P-5'-P-CCNC: 10 U/L (ref 7–45)
ANION GAP SERPL CALC-SCNC: 10 MMOL/L (ref 10–20)
AST SERPL W P-5'-P-CCNC: 16 U/L (ref 9–39)
BILIRUB SERPL-MCNC: 0.5 MG/DL (ref 0–1.2)
BUN SERPL-MCNC: 9 MG/DL (ref 6–23)
CALCIUM SERPL-MCNC: 9.5 MG/DL (ref 8.6–10.6)
CHLORIDE SERPL-SCNC: 103 MMOL/L (ref 98–107)
CO2 SERPL-SCNC: 30 MMOL/L (ref 21–32)
CREAT SERPL-MCNC: 0.49 MG/DL (ref 0.5–1.05)
FERRITIN SERPL-MCNC: 62 NG/ML (ref 8–150)
GFR SERPL CREATININE-BSD FRML MDRD: >90 ML/MIN/1.73M*2
GLUCOSE SERPL-MCNC: 90 MG/DL (ref 74–99)
IRON SATN MFR SERPL: 24 % (ref 25–45)
IRON SERPL-MCNC: 107 UG/DL (ref 35–150)
POTASSIUM SERPL-SCNC: 3.4 MMOL/L (ref 3.5–5.3)
PROT SERPL-MCNC: 7 G/DL (ref 6.4–8.2)
PTH-INTACT SERPL-MCNC: 91 PG/ML (ref 18.5–88)
SODIUM SERPL-SCNC: 140 MMOL/L (ref 136–145)
TIBC SERPL-MCNC: 446 UG/DL (ref 240–445)
UIBC SERPL-MCNC: 339 UG/DL (ref 110–370)

## 2023-12-11 LAB — VIT B1 PYROPHOSHATE BLD-SCNC: 96 NMOL/L (ref 70–180)

## 2023-12-12 DIAGNOSIS — K91.2 POSTOPERATIVE MALABSORPTION (HHS-HCC): ICD-10-CM

## 2023-12-12 DIAGNOSIS — Z98.84 S/P GASTRIC BYPASS: Primary | ICD-10-CM

## 2023-12-12 RX ORDER — UBIDECARENONE 75 MG
500 CAPSULE ORAL DAILY
Qty: 30 TABLET | Refills: 11 | Status: SHIPPED | OUTPATIENT
Start: 2023-12-12 | End: 2024-12-11

## 2023-12-12 RX ORDER — CALCIUM CITRATE/VITAMIN D3 500MG-12.5
1 TABLET,CHEWABLE ORAL 3 TIMES DAILY
Qty: 90 TABLET | Refills: 11 | Status: SHIPPED | OUTPATIENT
Start: 2023-12-12

## 2023-12-18 RX ORDER — LEVOTHYROXINE SODIUM 125 UG/1
125 TABLET ORAL DAILY
Qty: 30 TABLET | Refills: 0 | OUTPATIENT
Start: 2023-12-18 | End: 2024-12-17

## 2024-01-12 PROBLEM — K91.2 POSTOPERATIVE MALABSORPTION (HHS-HCC): Status: ACTIVE | Noted: 2023-07-06

## 2024-02-09 PROCEDURE — RXMED WILLOW AMBULATORY MEDICATION CHARGE

## 2024-02-13 ENCOUNTER — PHARMACY VISIT (OUTPATIENT)
Dept: PHARMACY | Facility: CLINIC | Age: 29
End: 2024-02-13
Payer: MEDICAID

## 2024-04-29 ENCOUNTER — PATIENT MESSAGE (OUTPATIENT)
Dept: SURGERY | Facility: CLINIC | Age: 29
End: 2024-04-29
Payer: MEDICAID

## 2024-04-30 NOTE — PATIENT COMMUNICATION
Unable to reach patient, left voicemail message for patient to return the call.     Patient needs post-op follow up with ROSALINDA.

## 2024-07-08 ENCOUNTER — NUTRITION (OUTPATIENT)
Dept: SURGERY | Facility: CLINIC | Age: 29
End: 2024-07-08
Payer: MEDICAID

## 2024-07-08 VITALS — WEIGHT: 175 LBS | HEIGHT: 64 IN | BODY MASS INDEX: 29.88 KG/M2

## 2024-07-08 NOTE — PROGRESS NOTES
Surgery Date:  12/21/22,      7/11/23 GJ revision for ulcer  [facility]   Procedure:  gastric bypass           ASSESSMENT:  Current weight pounds:     175.0              Ht:   64.0  in.   BMI:  30.0  Previous weight pounds:   185.0   3/5/24  Initial start weight pounds:   368.0  EBW pounds:  218.0  Total weight change pounds :  193.0  %EBW Lost: 88.5%    PROGRESS:  Nutrition Interventions for last encounter (date):   1.           Aim for 70-80 g protein per day.  Work on increasing protein intake by eating 2-3oz protein  (14-21 grams) at every meal.  Choose foods high in protein at meal and snack times; eggs/hard boiled eggs/cheese/Greek yogurt/cottage cheese/tuna or chicken packets/deli meat roll up/protein bars/protein chips.  Remember to eat protein first at meal and snack times. Track your intake daily.   2.          Drink 64 oz. of zero calorie beverages per day  3.          Continue no drinking 30 min before, during the meal and for 30 minutes after the meal  4.          Increase intensity and duration of exercise  5.          When you finish your Flintstones,  switch to either Equate from [employer] or UP & UP brand from [employer].  Take 1 tablet 2x/day.  Or you can get Centrum Adult and take 1 tablet 2x/day with food.  You can also purchase Citrical calcium citrate  or [employer] Equate brand.    Take 9370-2780 mg of calcium citrate daily.  Take in in divided doses of no more than 500-600 mg at a time 2 hours apart from each other and from your Flintstones. Or Centrum  6.         Attend monthly support groups    CHANGES IN TREATMENT:   Patient met goals:  Partially   24 hour food recall:   Breakfast:  2 eggs and grits   12 g   Snack:  none  Lunch:  chicken Roberta on bun  32 g  Snack:   fruit salad and 1/2 cottage cheese  12 g  Dinner:    2 oz roast 14 g  Snack: fruit salad and 1/2 cottage cheese 12 g  Beverages:    64 oz water    Alcohol:   none      Vitamins:    Vit D, B1, B12, 2  Flintstones,  8097-7986 mg calcium carbonate   Physical Activity:  walking for 60 min 3x/week  and bike riding 3-5x/week for 30 min during the week and 90 min on weekends.   Still taking PPI     READINESS TO LEARN:  Motivation to learn:           Interested      Understanding of instruction: Good         Anticipated Compliance: Good       Family Support: Unable to assess-family not present     Patient presents with post-op weight loss surgery gastric bypass.  Patient has lost 193.0  pounds since initial assessment accounting for 88.5% loss excess body weight.  Tolerating a regular diet without difficulty.  Protein intake is  adequate for post-op individual. Fluid consumption is  adequate. Patient is not supplementing recommended vitamin/minerals. Discussed taking the correct calcium.  Pt states no concerns/difficulties at this time. Pt still take PPI is doing well.     Malnutrition Screening  Significant unintentional weight loss? n/a  Eating less than 75% of usual intake for more than 2 weeks? n/a     Nutrition Diagnosis:   1. Increased protein and nutrition needs related to altered GI function as evidenced by pt. s/p gastric bypass.  2. Food- and nutrition-related knowledge deficit related to lack of prior exposure to surgical weight loss information as evidenced by diet recall.     Nutrition Interventions:   1. Modify type and amount of food and nutrients within meals and snacks.  2. Comprehensive Nutrition Education  -Nutrition education materials: SG schedule        Recommendations:    1. Eat at least 80 g  of protein per day  2. Continue to drink 64 oz. of zero calorie beverages per day  3. Continue no drinking 30 min before, during the meal and for 30 minutes after the meal  4. Increase intensity and duration of exercise  5. Continue current vit/min regimen.  When done with your current calcium, switch to calcium citrate. You can buy soft chew calcium online.  Look for Celebrate, Bariatric Advantage, Procare  Health, Bariatric Fusion.   6.             Attend monthly support groups    Nutrition Monitoring and Evaluation:   1-2 pounds weight loss per week  Criteria: weight check, food recall  Need for Follow-up:   yearly       Priti GENTILE, [facility]  Bariatric Surgery Dietitian  Phone: [phone]  Fax: [phone]

## 2024-07-18 ENCOUNTER — TELEPHONE (OUTPATIENT)
Dept: SURGERY | Facility: CLINIC | Age: 29
End: 2024-07-18
Payer: MEDICAID

## 2024-07-18 NOTE — TELEPHONE ENCOUNTER
Unable to reach patient, left voicemail message for patient to return the call.     Patient needs s/p Revision of RYGBP POV scheduled with ROSALINDA.

## 2025-05-27 ENCOUNTER — APPOINTMENT (OUTPATIENT)
Dept: PRIMARY CARE | Facility: CLINIC | Age: 30
End: 2025-05-27
Payer: MEDICAID

## 2025-06-03 ENCOUNTER — OFFICE VISIT (OUTPATIENT)
Dept: OBSTETRICS AND GYNECOLOGY | Facility: CLINIC | Age: 30
End: 2025-06-03
Payer: MEDICAID

## 2025-06-03 VITALS
SYSTOLIC BLOOD PRESSURE: 115 MMHG | HEART RATE: 78 BPM | WEIGHT: 178.3 LBS | DIASTOLIC BLOOD PRESSURE: 83 MMHG | BODY MASS INDEX: 29.71 KG/M2 | HEIGHT: 65 IN

## 2025-06-03 DIAGNOSIS — Z11.3 SCREENING EXAMINATION FOR STI: ICD-10-CM

## 2025-06-03 DIAGNOSIS — Z01.419 WELL WOMAN EXAM: Primary | ICD-10-CM

## 2025-06-03 PROBLEM — K28.9 ANASTOMOTIC ULCER: Status: RESOLVED | Noted: 2023-08-31 | Resolved: 2025-06-03

## 2025-06-03 PROBLEM — E87.6 HYPOKALEMIA: Status: RESOLVED | Noted: 2023-07-17 | Resolved: 2025-06-03

## 2025-06-03 PROBLEM — Z91.199 POOR COMPLIANCE: Status: RESOLVED | Noted: 2017-07-12 | Resolved: 2025-06-03

## 2025-06-03 PROBLEM — E83.42 HYPOMAGNESEMIA: Status: RESOLVED | Noted: 2023-07-17 | Resolved: 2025-06-03

## 2025-06-03 PROBLEM — K28.4: Status: RESOLVED | Noted: 2023-07-17 | Resolved: 2025-06-03

## 2025-06-03 PROBLEM — K56.600: Status: RESOLVED | Noted: 2023-07-10 | Resolved: 2025-06-03

## 2025-06-03 PROBLEM — D64.9 ANEMIA: Status: RESOLVED | Noted: 2023-08-31 | Resolved: 2025-06-03

## 2025-06-03 PROBLEM — N94.89 OTHER SPECIFIED CONDITIONS ASSOCIATED WITH FEMALE GENITAL ORGANS AND MENSTRUAL CYCLE: Status: RESOLVED | Noted: 2023-08-31 | Resolved: 2025-06-03

## 2025-06-03 PROBLEM — K91.2 POSTOPERATIVE MALABSORPTION (HHS-HCC): Status: RESOLVED | Noted: 2023-07-06 | Resolved: 2025-06-03

## 2025-06-03 PROBLEM — R30.0 DYSURIA: Status: RESOLVED | Noted: 2023-02-22 | Resolved: 2025-06-03

## 2025-06-03 PROBLEM — I95.9 HYPOTENSION: Status: RESOLVED | Noted: 2023-08-31 | Resolved: 2025-06-03

## 2025-06-03 PROBLEM — D72.829 ELEVATED WHITE BLOOD CELL COUNT, UNSPECIFIED: Status: RESOLVED | Noted: 2023-07-17 | Resolved: 2025-06-03

## 2025-06-03 PROBLEM — K80.50 BILIARY COLIC: Status: RESOLVED | Noted: 2023-05-18 | Resolved: 2025-06-03

## 2025-06-03 PROBLEM — G89.18 ACUTE POSTOPERATIVE PAIN: Status: RESOLVED | Noted: 2023-08-31 | Resolved: 2025-06-03

## 2025-06-03 PROBLEM — G93.49 OTHER ENCEPHALOPATHY: Status: RESOLVED | Noted: 2023-07-10 | Resolved: 2025-06-03

## 2025-06-03 PROBLEM — Z72.51 HIGH RISK HETEROSEXUAL BEHAVIOR: Status: RESOLVED | Noted: 2023-08-31 | Resolved: 2025-06-03

## 2025-06-03 PROBLEM — I10 ESSENTIAL (PRIMARY) HYPERTENSION: Status: RESOLVED | Noted: 2023-07-10 | Resolved: 2025-06-03

## 2025-06-03 PROBLEM — G93.9 DISORDER OF BRAIN: Status: RESOLVED | Noted: 2023-08-31 | Resolved: 2025-06-03

## 2025-06-03 PROBLEM — R91.1 SOLITARY PULMONARY NODULE: Status: RESOLVED | Noted: 2023-07-10 | Resolved: 2025-06-03

## 2025-06-03 PROBLEM — R10.9 ABDOMINAL PAIN: Status: RESOLVED | Noted: 2023-02-22 | Resolved: 2025-06-03

## 2025-06-03 PROBLEM — Z72.53 HIGH RISK BISEXUAL BEHAVIOR: Status: RESOLVED | Noted: 2023-08-31 | Resolved: 2025-06-03

## 2025-06-03 PROBLEM — Z90.49 ACQUIRED ABSENCE OF OTHER SPECIFIED PARTS OF DIGESTIVE TRACT: Status: RESOLVED | Noted: 2023-07-17 | Resolved: 2025-06-03

## 2025-06-03 PROBLEM — Z86.39 PERSONAL HISTORY OF OTHER ENDOCRINE, NUTRITIONAL AND METABOLIC DISEASE: Status: RESOLVED | Noted: 2023-02-22 | Resolved: 2025-06-03

## 2025-06-03 PROBLEM — R57.9 SHOCK (MULTI): Status: RESOLVED | Noted: 2023-07-17 | Resolved: 2025-06-03

## 2025-06-03 PROBLEM — Z79.899 OTHER LONG TERM (CURRENT) DRUG THERAPY: Status: RESOLVED | Noted: 2023-07-10 | Resolved: 2025-06-03

## 2025-06-03 PROBLEM — A59.01 TRICHOMONAL VULVOVAGINITIS: Status: RESOLVED | Noted: 2023-08-31 | Resolved: 2025-06-03

## 2025-06-03 PROBLEM — D68.9 COAGULATION DEFECT, UNSPECIFIED: Status: RESOLVED | Noted: 2023-07-17 | Resolved: 2025-06-03

## 2025-06-03 PROBLEM — K92.0 VOMITING BLOOD: Status: RESOLVED | Noted: 2023-08-31 | Resolved: 2025-06-03

## 2025-06-03 PROBLEM — K28.9 MARGINAL ULCER: Status: RESOLVED | Noted: 2023-08-31 | Resolved: 2025-06-03

## 2025-06-03 PROBLEM — R03.0 ELEVATED BP WITHOUT DIAGNOSIS OF HYPERTENSION: Status: RESOLVED | Noted: 2017-07-20 | Resolved: 2025-06-03

## 2025-06-03 PROBLEM — E83.51 HYPOCALCEMIA: Status: RESOLVED | Noted: 2023-07-17 | Resolved: 2025-06-03

## 2025-06-03 PROBLEM — Z71.89 OTHER SPECIFIED COUNSELING: Status: RESOLVED | Noted: 2023-08-31 | Resolved: 2025-06-03

## 2025-06-03 PROBLEM — D62 ACUTE POSTHEMORRHAGIC ANEMIA: Status: RESOLVED | Noted: 2023-07-17 | Resolved: 2025-06-03

## 2025-06-03 PROBLEM — F10.10 ALCOHOL ABUSE, UNCOMPLICATED: Status: RESOLVED | Noted: 2022-03-14 | Resolved: 2025-06-03

## 2025-06-03 PROBLEM — Z79.890 HORMONE REPLACEMENT THERAPY: Status: RESOLVED | Noted: 2023-07-10 | Resolved: 2025-06-03

## 2025-06-03 PROBLEM — K92.2 GASTROINTESTINAL HEMORRHAGE: Status: RESOLVED | Noted: 2023-07-06 | Resolved: 2025-06-03

## 2025-06-03 PROBLEM — O09.90 SUPERVISION OF HIGH-RISK PREGNANCY (HHS-HCC): Status: RESOLVED | Noted: 2017-02-06 | Resolved: 2025-06-03

## 2025-06-03 PROBLEM — Z98.890 OTHER SPECIFIED POSTPROCEDURAL STATES: Status: RESOLVED | Noted: 2023-08-31 | Resolved: 2025-06-03

## 2025-06-03 PROBLEM — B49 INFECTION DUE TO FUNGUS: Status: RESOLVED | Noted: 2023-07-20 | Resolved: 2025-06-03

## 2025-06-03 PROBLEM — N92.6 IRREGULAR MENSTRUATION, UNSPECIFIED: Status: RESOLVED | Noted: 2023-08-31 | Resolved: 2025-06-03

## 2025-06-03 PROBLEM — N87.0 MILD CERVICAL DYSPLASIA: Status: RESOLVED | Noted: 2023-08-31 | Resolved: 2025-06-03

## 2025-06-03 PROCEDURE — 58301 REMOVE INTRAUTERINE DEVICE: CPT | Mod: GC | Performed by: STUDENT IN AN ORGANIZED HEALTH CARE EDUCATION/TRAINING PROGRAM

## 2025-06-03 PROCEDURE — 99395 PREV VISIT EST AGE 18-39: CPT | Mod: GC,25 | Performed by: STUDENT IN AN ORGANIZED HEALTH CARE EDUCATION/TRAINING PROGRAM

## 2025-06-03 PROCEDURE — 3008F BODY MASS INDEX DOCD: CPT | Performed by: STUDENT IN AN ORGANIZED HEALTH CARE EDUCATION/TRAINING PROGRAM

## 2025-06-03 PROCEDURE — 99395 PREV VISIT EST AGE 18-39: CPT | Performed by: STUDENT IN AN ORGANIZED HEALTH CARE EDUCATION/TRAINING PROGRAM

## 2025-06-03 PROCEDURE — 58300 INSERT INTRAUTERINE DEVICE: CPT | Performed by: STUDENT IN AN ORGANIZED HEALTH CARE EDUCATION/TRAINING PROGRAM

## 2025-06-03 PROCEDURE — 2500000004 HC RX 250 GENERAL PHARMACY W/ HCPCS (ALT 636 FOR OP/ED): Mod: JZ,SE | Performed by: STUDENT IN AN ORGANIZED HEALTH CARE EDUCATION/TRAINING PROGRAM

## 2025-06-03 PROCEDURE — 87591 N.GONORRHOEAE DNA AMP PROB: CPT | Performed by: STUDENT IN AN ORGANIZED HEALTH CARE EDUCATION/TRAINING PROGRAM

## 2025-06-03 PROCEDURE — 1036F TOBACCO NON-USER: CPT | Performed by: STUDENT IN AN ORGANIZED HEALTH CARE EDUCATION/TRAINING PROGRAM

## 2025-06-03 PROCEDURE — 87661 TRICHOMONAS VAGINALIS AMPLIF: CPT | Performed by: STUDENT IN AN ORGANIZED HEALTH CARE EDUCATION/TRAINING PROGRAM

## 2025-06-03 RX ADMIN — LEVONORGESTREL 1 EACH: 52 INTRAUTERINE DEVICE INTRAUTERINE at 09:30

## 2025-06-03 ASSESSMENT — ENCOUNTER SYMPTOMS
EYES NEGATIVE: 0
ALLERGIC/IMMUNOLOGIC NEGATIVE: 0
RESPIRATORY NEGATIVE: 0
MUSCULOSKELETAL NEGATIVE: 0
PSYCHIATRIC NEGATIVE: 0
ENDOCRINE NEGATIVE: 0
NEUROLOGICAL NEGATIVE: 0
CARDIOVASCULAR NEGATIVE: 0
HEMATOLOGIC/LYMPHATIC NEGATIVE: 0
CONSTITUTIONAL NEGATIVE: 0
GASTROINTESTINAL NEGATIVE: 0

## 2025-06-03 ASSESSMENT — PATIENT HEALTH QUESTIONNAIRE - PHQ9
2. FEELING DOWN, DEPRESSED OR HOPELESS: NOT AT ALL
1. LITTLE INTEREST OR PLEASURE IN DOING THINGS: NOT AT ALL
SUM OF ALL RESPONSES TO PHQ9 QUESTIONS 1 AND 2: 0

## 2025-06-03 ASSESSMENT — PAIN SCALES - GENERAL: PAINLEVEL_OUTOF10: 0-NO PAIN

## 2025-06-03 NOTE — PROGRESS NOTES
"Janneth García is a 29 y.o. female who is here for annual exam    HPI: Patient had an IUD placed 8 years ago and did not have periods until last year, when she started having abnormal uterine bleeding 2-3x per month with associated cramping, nausea and vomiting. Patient rates pain 10/10. Tylenol does not alleviate the pain. Patient endorses moderate bleeding, going through 2 to 3 pads per day if there is bleeding, but denies soaking through pads.    GynHx:   - amenorrheic until last year  - patient is sexually active  - patient denies sexual concerns  - Current contraception: condoms  - STIs: no hx of STIs, screening today     Social History:  - T/E/D: patient drinks wine 2x weekly, uses marijuana daily  - exercise: regular exercising  - occupation: phlebotomist  - food/housing insecurity: denies    PMH:   - hypothyroidism s/p thyroid ablation  - PUD    Past Surgical History:   Procedure Laterality Date    CHOLECYSTECTOMY  2023    INTESTINAL BYPASS  2022    Fabian-en-Y    INTESTINAL BYPASS  2023    revision of GJ anastamosis for nonhealing bleeding erosive ulcer       OB History    Para Term  AB Living   1 1 1   1   SAB IAB Ectopic Multiple Live Births       1      # Outcome Date GA Lbr Ramon/2nd Weight Sex Type Anes PTL Lv   1 Term 17 38w5d 22:01 / 00:07 2.72 kg M Vag-Spont EPI N VLADIMIR      Birth Comments: IOL for gHTN     Screening  -Pap and HPV: no h/o, due today    Family History   Problem Relation Name Age of Onset    Breast cancer Neg Hx      Cervical cancer Neg Hx      Endometrial cancer Neg Hx      Colon cancer Neg Hx        No family history of bleeding disorders.    Meds:   Omeprazole for PUD, levothyroxine for hypothyroidism, LNG IUD.    Objective   /83   Pulse 78   Ht 1.651 m (5' 5\")   Wt 80.9 kg (178 lb 4.8 oz)   LMP 2025   BMI 29.67 kg/m²     Physical exam:  General:  AAOx3, No acute distress  Cardiovascular: Warm and well perfused  Respiratory: Normal " respiratory effort   Abdominal:  Soft, non-tender, no masses  Vulva: Normal architecture without erythema, masses, or lesions.  Vagina: Normal mucosa without lesions, masses, or atrophy. No abnormal vaginal discharge.   Cervix: Normal without masses, lesions, or signs of cervicitis.   Extremities: No edema, no calf tenderness   Skin: No rashes or lesions visualized   Psych: normal mood and affect    Patient ID: Janneth García is a 29 y.o. female.    IUD Management    Performed by: Sally Goodman MD  Authorized by: Aleyda Del Real MD    Procedure: IUD removal and insertion    Consent obtained by patient, parent, or legal power of  - including discussion of procedure risks and benefits, patient questions answered, and patient education provided: yes    Other reason for removal:  Time for removal, s/p 8 yrs  Strings visualized: yes    Cervix cleaned with: iodopovidone    Tenaculum applied to cervix: yes    IUD grasped by forceps: yes    IUD removed: yes    Date/Time of Removal:  6/3/2025 11:17 AM  IUD intact: yes    Removal comments: Strings visualized and grasped with a ring. String broken. Lidocaine block instilled. IUD removed with alligator forceps  Pregnancy risk: reasonably certain the patient is not pregnant    Date/Time of Insertion:  6/3/2025 11:18 AM  Immediately prior to procedure a time out was called: yes    Pelvic exam performed: yes    Speculum placed in vagina: yes    Cervix cleaned and prepped: yes    Tenaculum/Allis/Ring Forceps applied to cervix: yes    Anesthesia used: yes    Local anesthesia:  Intracervical  Local anesthetic:  Lidocaine  Volume (mL):  10  Uterus sound depth (cm):  7  IUD inserted without complications: yes    OSM: 1 each levonorgestrel (Liletta) IUD  Strings trimmed to (cm):  2  Patient tolerated procedure well: yes    Estimated blood loss (mL):  5  Intended removal date: 8 years        Problem List Items Addressed This Visit       Well woman exam - Primary     Current Assessment & Plan   - pap today  - STI screening today  - cbc for AUB  - IUD removal and replacement  - PCP referral         Relevant Medications    levonorgestreL (Liletta) IUD 52 mg (Start on 6/3/2025 11:30 AM)    Other Relevant Orders    THINPREP PAP TEST (>30)    HIV 1/2 Antigen/Antibody Screen with Reflex to Confirmation    Syphilis Screen with Reflex    Hepatitis B Surface Antigen    Hepatitis C Antibody    CBC    IUD Management    Screening examination for STI    Relevant Orders    THINPREP PAP TEST (>30)    HIV 1/2 Antigen/Antibody Screen with Reflex to Confirmation    Syphilis Screen with Reflex    Hepatitis B Surface Antigen    Hepatitis C Antibody     Thank you for coming to your annual exam.     Seen and d/w Dr. Nasima Snow, 3rd year medical student    Sally Goodman MD, PGY-4

## 2025-06-03 NOTE — PROGRESS NOTES
I saw and evaluated the patient. I personally obtained the key and critical portions of the history and physical exam or was physically present for key and critical portions performed by the resident/fellow. I reviewed the resident/fellow's documentation and discussed the patient with the resident/fellow. I agree with the resident/fellow's medical decision making as documented in the note.    I was present for the entire procedure.    Aleyda Del Real MD

## 2025-06-05 ENCOUNTER — TELEPHONE (OUTPATIENT)
Dept: OBSTETRICS AND GYNECOLOGY | Facility: CLINIC | Age: 30
End: 2025-06-05
Payer: MEDICAID

## 2025-06-05 DIAGNOSIS — A74.9 CHLAMYDIA: Primary | ICD-10-CM

## 2025-06-05 LAB
C TRACH RRNA SPEC QL NAA+PROBE: POSITIVE
ERYTHROCYTE [DISTWIDTH] IN BLOOD BY AUTOMATED COUNT: 15.4 % (ref 11–15)
HBV SURFACE AG SERPL QL IA: NORMAL
HCT VFR BLD AUTO: 42.1 % (ref 35–45)
HCV AB SERPL QL IA: NORMAL
HGB BLD-MCNC: 13.2 G/DL (ref 11.7–15.5)
HIV 1+2 AB+HIV1 P24 AG SERPL QL IA: NORMAL
MCH RBC QN AUTO: 29.7 PG (ref 27–33)
MCHC RBC AUTO-ENTMCNC: 31.4 G/DL (ref 32–36)
MCV RBC AUTO: 94.8 FL (ref 80–100)
N GONORRHOEA DNA SPEC QL PROBE+SIG AMP: NEGATIVE
PLATELET # BLD AUTO: 251 THOUSAND/UL (ref 140–400)
PMV BLD REES-ECKER: 11.2 FL (ref 7.5–12.5)
RBC # BLD AUTO: 4.44 MILLION/UL (ref 3.8–5.1)
T PALLIDUM AB SER QL IA: NEGATIVE
T VAGINALIS RRNA SPEC QL NAA+PROBE: NEGATIVE
WBC # BLD AUTO: 8.6 THOUSAND/UL (ref 3.8–10.8)

## 2025-06-05 RX ORDER — DOXYCYCLINE 100 MG/1
100 CAPSULE ORAL 2 TIMES DAILY
Qty: 14 CAPSULE | Refills: 0 | Status: SHIPPED | OUTPATIENT
Start: 2025-06-05 | End: 2025-06-12

## 2025-06-05 NOTE — TELEPHONE ENCOUNTER
----- Message from Sally Goodman sent at 6/5/2025  2:15 PM EDT -----  Hi can we please call this pt and let her know she has chlamydia and I sent rx to her pharmacy. Thanks!   ----- Message -----  From: [employer] Results In  Sent: 6/4/2025   3:53 AM EDT  To: Sally Goodman MD

## 2025-06-05 NOTE — TELEPHONE ENCOUNTER
TELEPHONE CALL TO PATIENT  Identified by name and date of birth.  Patient notified of result, treatment, partner treatment  +CT - doxycycline 100mg   EPT offered - sent to Dr Goodman to sign.  Instructed no unprotected sex x 7 days after treatment  Encouraged use of condoms routinely  Patient verbalizes understanding

## 2025-06-17 LAB
CYTOLOGY CMNT CVX/VAG CYTO-IMP: NORMAL
HPV HR 12 DNA GENITAL QL NAA+PROBE: NEGATIVE
HPV HR GENOTYPES PNL CVX NAA+PROBE: NEGATIVE
HPV16 DNA SPEC QL NAA+PROBE: NEGATIVE
HPV18 DNA SPEC QL NAA+PROBE: NEGATIVE
LAB AP HPV GENOTYPE QUESTION: YES
LAB AP HPV HR: NORMAL
LAB AP PAP ADDITIONAL TESTS: NORMAL
LABORATORY COMMENT REPORT: NORMAL
LMP START DATE: NORMAL
PATH REPORT.TOTAL CANCER: NORMAL